# Patient Record
Sex: FEMALE | Race: WHITE
[De-identification: names, ages, dates, MRNs, and addresses within clinical notes are randomized per-mention and may not be internally consistent; named-entity substitution may affect disease eponyms.]

---

## 2018-03-10 ENCOUNTER — HOSPITAL ENCOUNTER (EMERGENCY)
Dept: HOSPITAL 68 - ERH | Age: 58
End: 2018-03-10
Payer: COMMERCIAL

## 2018-03-10 VITALS — BODY MASS INDEX: 30.2 KG/M2 | HEIGHT: 57 IN | WEIGHT: 140 LBS

## 2018-03-10 VITALS — DIASTOLIC BLOOD PRESSURE: 72 MMHG | SYSTOLIC BLOOD PRESSURE: 101 MMHG

## 2018-03-10 DIAGNOSIS — R10.30: ICD-10-CM

## 2018-03-10 DIAGNOSIS — N39.0: Primary | ICD-10-CM

## 2018-03-10 LAB
ABSOLUTE GRANULOCYTE CT: 11.6 /CUMM (ref 1.4–6.5)
BASOPHILS # BLD: 0 /CUMM (ref 0–0.2)
BASOPHILS NFR BLD: 0.3 % (ref 0–2)
EOSINOPHIL # BLD: 0.2 /CUMM (ref 0–0.7)
EOSINOPHIL NFR BLD: 1.3 % (ref 0–5)
ERYTHROCYTE [DISTWIDTH] IN BLOOD BY AUTOMATED COUNT: 16.5 % (ref 11.5–14.5)
GRANULOCYTES NFR BLD: 73.1 % (ref 42.2–75.2)
HCT VFR BLD CALC: 40.7 % (ref 37–47)
LYMPHOCYTES # BLD: 3.2 /CUMM (ref 1.2–3.4)
MCH RBC QN AUTO: 25.6 PG (ref 27–31)
MCHC RBC AUTO-ENTMCNC: 32.4 G/DL (ref 33–37)
MCV RBC AUTO: 79 FL (ref 81–99)
MONOCYTES # BLD: 0.8 /CUMM (ref 0.1–0.6)
PLATELET # BLD: 338 /CUMM (ref 130–400)
PMV BLD AUTO: 9.1 FL (ref 7.4–10.4)
RED BLOOD CELL CT: 5.16 /CUMM (ref 4.2–5.4)
WBC # BLD AUTO: 15.8 /CUMM (ref 4.8–10.8)

## 2018-03-10 NOTE — ULTRASOUND REPORT
EXAMINATION:
ULTRASOUND OF THE PELVIS
 
CLINICAL INFORMATION:
Lower abdominal pain. Postmenopausal bleeding. Rule out mass, cyst, polyp.
 
COMPARISON:
CT scan of the abdomen and pelvis dated 09/13/2008.
 
TECHNIQUE:
Transabdominal and transvaginal pelvic ultrasound.
 
A transvaginal study was performed in addition to the transabdominal study
which did not yield an adequate examination of the uterus and ovaries due to
superimposed distended gas-filled loops of bowel.
 
FINDINGS:
 
The study is technically limited. The transvaginal exam was very painful for
the patient and difficult to tolerate given her mobility. No blood was seen
on the transvaginal transducer when it was withdrawn from the vagina after
scanning.
 
 
Uterus: The uterus is not seen transabdominally. Transvaginally, the uterus
appears to be anteverted and is poorly visualized in outline, measuring 5.3 x
2.4 cm on longitudinal imaging. Uterus is not definitely visualized in
transverse dimension. The endometrial stripe and cervix are not adequately
visualized. Uterine myometrial detail is essentially nondiagnostic.
 
Ovaries: The ovaries bilaterally are not seen.
 
Other: No adnexal mass or free fluid collection seen.
 
IMPRESSION:
Essentially nondiagnostic exam of the uterus and ovaries. Please consider
alternative means of evaluating the pelvic organs, such as an MRI scan of the
pelvis with and without contrast.

## 2018-03-10 NOTE — ED GI/GU/ABDOMINAL COMPLAINT
History of Present Illness
 
General
Chief Complaint: Abdominal Pain/Flank Pain
Stated Complaint: LOWER ABD PAIN; ?VAGINAL BLEEDING
Source: patient, family
Exam Limitations: no limitations
 
Vital Signs & Intake/Output
Vital Signs & Intake/Output
 Vital Signs
 
 
Date Time Temp Pulse Resp B/P B/P Pulse O2 O2 Flow FiO2
 
     Mean Ox Delivery Rate 
 
03/10 1941 98.7 98 16 101/72  99 Room Air  
 
03/10 1826 98.7 99 18 121/74  98 Room Air  
 
 
 ED Intake and Output
 
 
  0000 03/10 1200
 
Intake Total  
 
Output Total  
 
Balance  
 
   
 
Patient 140 lb 
 
Weight  
 
Weight Reported by Patient 
 
Measurement  
 
Method  
 
 
 
Allergies
Coded Allergies:
Penicillins (HIVES 16)
Tetracyclines (HIVES 16)
aspirin (From SOMA COMPOUND WITH CODEINE) (HIVES 16)
carisoprodol (From SOMA COMPOUND WITH CODEINE) (HIVES 16)
cherry (UNKNOWN PER PT  03/10/18)
codeine (From SOMA COMPOUND WITH CODEINE) (HIVES 16)
doxycycline (HIVES 16)
lactose (LACTOSE INTOLERANT 03/10/18)
mushroom (UNKNOWN PER PT  03/10/18)
nabumetone (From RELAFEN) (ANAPHYLAXIS 16)
Uncoded Allergies:
PDS SUTURE MATERIAL (CRYSTALLIZED TISSUE 16)
 
Reconcile Medications
Albuterol Sulfate (Proventil Hfa) 6.7 GM HFA.AER.AD   2 PUF INH PRN ASTHMA- 
VIRAL INDUCED  (Reported)
Amitriptyline HCl 75 MG TABLET   1 TAB PO QPM SLEEP/NERVE PAIN  (Reported)
Ciprofloxacin HCl (Cipro) 500 MG TABLET   1 TAB PO BID uti
diphenhydrAMINE HCl (Benadryl) 25 MG CAP   2 CAP PO QPM ALLERGIES  (Reported)
Lidocaine (Lidoderm) 1 EACH ADH..PATCH   3 PAT TOP DAILY PRN PAIN  (Reported)
     may wear up to 12 hours
Metoprolol Succinate 25 MG TAB   1 TAB PO QAM HEART  (Reported)
Morphine Sulfate (Morphine Sulfate ER) 30 MG TABLET.ER   1 TAB PO TID PAIN  (
Reported)
Morphine Sulfate 15 MG TABLET   1 TAB PO BID BREAKTHROUGH PAIN  (Reported)
Omeprazole 40 MG CAPSULE.DR   1 CAP PO QPM GI  (Reported)
Polyethylene Glycol 3350 255 GM POWDER   17 GM PO PRN GI  (Reported)
Tizanidine HCl (Zanaflex) 4 MG TABLET   2 TAB PO QPM NERVE PAIN/SPASMS  (
Reported)
 
Triage Note:
PT TO ED FOR LOWER BILATERAL ABD PAIN AND PRESSURE
WITH ASSOCIATED VAGINAL BLEEDING THAT BEGAN THIS
AM, REPORTING MINIMAL BLEEDING WHILE URINATING.
Triage Nurses Notes Reviewed? yes
LMP (ages 10-50): post menopausal
Pregnant? N
Is pt currently breastfeeding? No
Onset: Gradual
Duration: hour(s):
Timing: single episode today
Quality/Severity: moderate
Location: suprapubic
HPI:
58YO female presents to emergency department complaining of suprapubic pain 
beginning last night.  Patient reports dysuria beginning this morning with 
vaginal spotting beginning this morning as well.  Patient states her last 
menstrual period was over 2 years ago.  Patient does report a history of 
interstitial cystitis and frequent UTIs in the past however she has not a UTI in
years as well.  Patient is sexually active, monogamous with her .  The 
patient denies vaginal discharge, diarrhea, constipation, nausea, vomiting, 
fevers, chills.
(Rosanna Wynn)
 
Past History
 
Travel History
Traveled to [place] past 21 day No
 
Medical History
Any Pertinent Medical History? see below for history
Neurological: NONE
EENT: NONE
Cardiovascular: hyperlipidemia
Respiratory: asthma
Gastrointestinal: NONE
Hepatic: NONE
Renal: NONE
Musculoskeletal: chronic back pain, psoariatic arthritis, CARPAL TUNNEL 
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
Psychiatric: NONE
Endocrine: NONE
Blood Disorders: NONE
Cancer(s): NONE
GYN/Reproductive: NONE
History of CDIFF: No
 
Surgical History
Surgical History: cervical, thoracic and lumbar surgery, uterine artery ablation
 
Psychosocial History
Who do you live with Family
Services at Home None
What is your primary language English
Tobacco Use: Never used
ETOH Use: denies use
Illicit Drug Use: denies illicit drug use
 
Family History
Hx Contributory? No
(Rosanna Wynn)
 
Review of Systems
 
Review of Systems
Constitutional:
Reports: no symptoms. 
EENTM:
Reports: no symptoms. 
Respiratory:
Reports: no symptoms. 
Cardiovascular:
Reports: no symptoms. 
GI:
Reports: see HPI. 
Genitourinary:
Reports: see HPI. 
Musculoskeletal:
Reports: no symptoms. 
Skin:
Reports: no symptoms. 
Neurological/Psychological:
Reports: no symptoms. 
Hematologic/Endocrine:
Reports: no symptoms. 
Immunologic/Allergic:
Reports: no symptoms. 
All Other Systems: Reviewed and Negative
(Rosanna Wynn)
 
Physical Exam
 
Physical Exam
General Appearance: well developed/nourished, no apparent distress, alert, awake
Head: atraumatic, normal appearance
Eyes:
Bilateral: normal appearance. 
Ears, Nose, Throat, Mouth: hearing grossly normal
Neck: normal inspection, supple, full range of motion
Respiratory: normal breath sounds, no respiratory distress, lungs clear
Cardiovascular: tachycardia
Gastrointestinal: normal bowel sounds, soft, no organomegaly, suprapubic 
tenderness
Back: normal inspection, normal range of motion
Extremities: normal range of motion
Neurologic/Psych: awake, alert, oriented x 3
Skin: intact, normal color, warm/dry
 
Core Measures
ACS in differential dx? No
Sepsis Present: No
Sepsis Focused Exam Completed? No
(Linh VAN,Rosanna Arshad)
 
Progress
Differential Diagnosis: hernia, kidney stone, ovarian cyst, ovarian torsion, PID
/cervicitis, UTI/pyelo, dysfunctional uterine bleeding, malignancy, fibroid, 
polyp
Plan of Care:
 Orders
 
 
Procedure Date/time Status
 
URINALYSIS 03/10 1405 Complete
 
LIPASE 03/10 1405 Complete
 
COMPREHENSIVE METABOLIC PANEL 03/10 1405 Complete
 
CBC WITHOUT DIFFERENTIAL 03/10 1405 Complete
 
 
 Laboratory Tests
 
 
 
03/10/18 1444:
Anion Gap 14, Estimated GFR > 60, BUN/Creatinine Ratio 17.5, Glucose 97, Calcium
9.9, Total Bilirubin 0.3, AST 13  L, ALT 21, Alkaline Phosphatase 107, Total 
Protein 7.3, Albumin 4.3, Globulin 3.0, Albumin/Globulin Ratio 1.4, Lipase 33, 
CBC w Diff NO MAN DIFF REQ, RBC 5.16, MCV 79.0  L, MCH 25.6  L, MCHC 32.4  L, 
RDW 16.5  H, MPV 9.1, Gran % 73.1, Lymphocytes % 20.2  L, Monocytes % 5.1, 
Eosinophils % 1.3, Basophils % 0.3, Absolute Granulocytes 11.6  H, Absolute 
Lymphocytes 3.2, Absolute Monocytes 0.8  H, Absolute Eosinophils 0.2, Absolute 
Basophils 0
 
03/10/18 1440:
Urinalysis MOD  H, Urine Color BLDY  H, Urine Clarity CLDY  H, Urine pH 6.5, Ur 
Specific Gravity 1.025, Urine Protein >=300  H, Urine Ketones TRACE  H, Urine 
Nitrite POS  H, Urine Bilirubin NEG@ICTO, Urine Urobilinogen 1.0, Ur Leukocyte 
Esterase MOD  H, Ur Microscopic SEDIMENT EXAMINED, Urine RBC PACKD  H, Urine WBC
> 75  H, Ur Epithelial Cells FEW, Urine Hemoglobin LARGE  H, Urine Glucose NEG
 
Ultrasound is not a definitive study given patient's body habitus and bowel 
loops obstructing view however no acute abnormality detected on ultrasound.  
Urine shows evidence for UTI, patient also has tachycardia and leukocytosis.  
Given these findings will obtain an abdominal CT scan for further assessment.
 
CT scan is within normal limits.  Patient's vital signs have improved following 
IV fluids.  Patient to begin Cipro antibiotics regarding her UTI and follow-up 
with urologist.  Patient was given strict return precautions, she feels 
comfortable going home at this time.  Vital signs have improved, she is nontoxic
appearing, no acute distress.  The patient agrees with the plan of care.  The 
patient was discussed with Dr. Steele.
Diagnostic Imaging:
Viewed by Me: CT Scan, Ultrasound.  Discussed w/RAD: CT Scan, Ultrasound. 
Radiology Impression: PATIENT: SEBASTIAN VALDEZ  MEDICAL RECORD NO: [id number] 
PRESENT AGE: 57  PATIENT ACCOUNT NO: [id number] : 62  LOCATION: [facility] 
ORDERING PHYSICIAN: Rosanna VAN     SERVICE DATE: 03/10/ EXAM 
TYPE: US - US-TRANSVAGINAL EXAMINATION: ULTRASOUND OF THE PELVIS CLINICAL 
INFORMATION: Lower abdominal pain. Postmenopausal bleeding. Rule out mass, cyst,
polyp. COMPARISON: CT scan of the abdomen and pelvis dated 2008. TECHNIQUE
: Transabdominal and transvaginal pelvic ultrasound. A transvaginal study was 
performed in addition to the transabdominal study which did not yield an 
adequate examination of the uterus and ovaries due to superimposed distended gas
-filled loops of bowel. FINDINGS: The study is technically limited. The 
transvaginal exam was very painful for the patient and difficult to tolerate 
given her mobility. No blood was seen on the transvaginal transducer when it was
withdrawn from the vagina after scanning. Uterus: The uterus is not seen 
transabdominally. Transvaginally, the uterus appears to be anteverted and is 
poorly visualized in outline, measuring 5.3 x 2.4 cm on longitudinal imaging. 
Uterus is not definitely visualized in transverse dimension. The endometrial 
stripe and cervix are not adequately visualized. Uterine myometrial detail is 
essentially nondiagnostic. Ovaries: The ovaries bilaterally are not seen. Other:
No adnexal mass or free fluid collection seen. IMPRESSION: Essentially 
nondiagnostic exam of the uterus and ovaries. Please consider alternative means 
of evaluating the pelvic organs, such as an MRI scan of the pelvis with and 
without contrast. DICTATED BY: Giana Nieves MD  DATE/TIME DICTATED:03/10/18 /
1630 :[facility]  DATE/TIME TRANSCRIBED:03/10/18 / 1630 
CONFIDENTIAL, DO NOT COPY WITHOUT APPROPRIATE AUTHORIZATION.  <Electronically 
signed in Other Vendor System>                                                  
                                     SIGNED BY: Giana Nieves MD 03/10/18 1644
, PATIENT: SEBASTIAN VALDEZ  MEDICAL RECORD NO: [id number] PRESENT AGE: 57  PATIENT 
ACCOUNT NO: [id number] : 62  LOCATION: [facility] ORDERING PHYSICIAN: Rosanna VAN     SERVICE DATE: 03/10/ EXAM TYPE: CAT - CT ABD & PELVIS W
IV CONTRAST EXAMINATION: CT ABDOMEN AND PELVIS WITH CONTRAST CLINICAL 
INFORMATION: Dysuria. Abdominal pain. Rule out colitis, diverticulitis, 
pyelonephritis. COMPARISON: The scan of the abdomen and pelvis dated 2008.
TECHNIQUE: Multidetector volumetric imaging was performed of the abdomen and 
pelvis following IV administration of 95 mL of Omnipaque 320 intravenous 
contrast. Sagittal and coronal reformatted images were obtained on the 
technologist's workstation. DLP: 287.19 mGy-cm FINDINGS: LUNG BASES: The 
visualized lung bases are unremarkable. LIVER, GALLBLADDER, AND BILIARY TREE: 
The liver is normal in size, shape, and attenuation. No focal hepatic lesion or 
biliary ductal dilatation is present. The gallbladder is unremarkable with no 
evidence of radiopaque gallstones, gallbladder wall thickening, or obvious 
pericholecystic inflammatory changes. PANCREAS: Unremarkable. SPLEEN: 
Unremarkable. ADRENAL GLANDS: Unremarkable. KIDNEYS AND URETERS: The kidneys are
normal in size, shape, and attenuation. No hydronephrosis, hydroureter, or 
calculi seen. No perinephric stranding. BLADDER: Unremarkable. GASTROINTESTINAL 
TRACT: Some fullness is seen at the GE junction, likely due to tiny hiatal 
hernia. The small and large bowel are unremarkable. The appendix is 
unremarkable. ABDOMINAL WALL: There is likely a tiny fat-containing umbilical 
hernia. LYMPH NODES: Normal. VASCULAR: Unremarkable. PELVIC VISCERA: Uterus and 
adnexa are normal. OSSEOUS STRUCTURES: Moderate degenerative disc disease at L4-
L5 and L5-S1. Mild vertebral spondylosis in lower thoracic spine. Mild facet 
arthropathy in lower lumbar spine. IMPRESSION: 1. No significant abnormality. No
evidence of colitis, diverticulitis or pyelonephritis. 2. Probable tiny hiatal 
hernia and fat-containing umbilical hernia. DICTATED BY: Giana Nieves MD  
DATE/TIME DICTATED:03/10/18 / 1848 :[facility]  DATE/TIME 
TRANSCRIBED:03/10/18 / 1848 CONFIDENTIAL, DO NOT COPY WITHOUT APPROPRIATE 
AUTHORIZATION.  <Electronically signed in Other Vendor System>                  
                                                                     SIGNED BY: 
Giana Nieves MD 03/10/18 1859
Initial ED EKG: none
(Linh VAN,Rosanna Arshad)
 
Departure
 
Departure
Disposition: HOME OR SELF CARE
Condition: Stable
Clinical Impression
Primary Impression: UTI (urinary tract infection)
Qualifiers:  Urinary tract infection type: acute cystitis Hematuria presence: 
with hematuria Qualified Code: N30.01 - Acute cystitis with hematuria
Secondary Impressions: Suprapubic pain
Referrals:
Oscar DICKEY,Marylou Espitia MD,Geovanna (PCP/Family)
 
Ruby DICKEY,Sami ORTEZ
 
Additional Instructions:
Begin antibiotics tonight.  Take full course of antibiotics.  Follow-up with 
urologist.  It is also recommended that you follow up with OB/GYN.  Return if 
any worsening symptoms or concerns.
 
Please note that there might be incidental findings in your evaluation that are 
unrelated to the current emergency department visit.  Please notify your primary
care doctor about this emergency department visit in order to obtain and review 
all of the testing performed so that these incidental findings can be monitored 
as needed.
 
If you had an x-ray performed, please understand that some fractures may not be 
seen on the initial set of x-rays.  If your symptoms persist you might need a 
repeat set of x-rays to check for such a fracture.
 
If you had a laceration evaluated, please understand that foreign bodies such as
glass or wood may not be visible to the naked eye or on plain x-rays.  If the 
wound becomes red, swollen, increasingly more painful or if there is any 
drainage from the wound, please have it reevaluated by a physician for the 
possibility of a retained foreign body.
 
If you're unable to follow up as outlined in the discharge instructions please 
return to the emergency department.
 
Thank you for choosing the [facility] Emergency Department for your care.
It was a pleasure to serve you today.
Departure Forms:
Customer Survey
General Discharge Information
Prescriptions:
Current Visit Scripts
Ciprofloxacin HCl (Cipro) 1 TAB PO BID  
     #14 TAB 
 
 
(Linh VAN,Rosanna Arshad)
 
PA/NP Co-Sign Statement
Statement:
ED Attending supervision documentation-
 
[] I saw and evaluated the patient. I have also reviewed all the pertinent lab 
results and diagnostic results. I agree with the findings and the plan of care 
as documented in the PA's/NP's documentation. 
 
[X] I have reviewed the ED Record and agree with the PA's/NP's documentation.
 
[] Additions or exceptions (if any) to the PAs/NP's note and plan are 
summarized below:
[]
 
(Cedric DICKEY,Duncan SORIANO)

## 2018-03-10 NOTE — CT SCAN REPORT
EXAMINATION:
CT ABDOMEN AND PELVIS WITH CONTRAST
 
CLINICAL INFORMATION:
Dysuria. Abdominal pain. Rule out colitis, diverticulitis, pyelonephritis.
 
COMPARISON:
The scan of the abdomen and pelvis dated 09/13/2008.
 
TECHNIQUE:
Multidetector volumetric imaging was performed of the abdomen and pelvis
following IV administration of 95 mL of Omnipaque 320 intravenous contrast.
Sagittal and coronal reformatted images were obtained on the technologist's
workstation.
 
DLP:
287.19 mGy-cm
 
FINDINGS:
 
LUNG BASES: The visualized lung bases are unremarkable.
 
LIVER, GALLBLADDER, AND BILIARY TREE: The liver is normal in size, shape, and
attenuation. No focal hepatic lesion or biliary ductal dilatation is present.
The gallbladder is unremarkable with no evidence of radiopaque gallstones,
gallbladder wall thickening, or obvious pericholecystic inflammatory changes.
 
 
PANCREAS: Unremarkable.
 
SPLEEN: Unremarkable.
 
ADRENAL GLANDS: Unremarkable.
 
KIDNEYS AND URETERS: The kidneys are normal in size, shape, and attenuation.
No hydronephrosis, hydroureter, or calculi seen. No perinephric stranding.
 
BLADDER: Unremarkable.
 
GASTROINTESTINAL TRACT: Some fullness is seen at the GE junction, likely due
to tiny hiatal hernia. The small and large bowel are unremarkable. The
appendix is unremarkable.
 
ABDOMINAL WALL: There is likely a tiny fat-containing umbilical hernia.
 
LYMPH NODES: Normal.
 
VASCULAR: Unremarkable.
 
PELVIC VISCERA: Uterus and adnexa are normal.
 
OSSEOUS STRUCTURES: Moderate degenerative disc disease at L4-L5 and L5-S1.
Mild vertebral spondylosis in lower thoracic spine. Mild facet arthropathy in
lower lumbar spine.
 
IMPRESSION:
 
1. No significant abnormality. No evidence of colitis, diverticulitis or
pyelonephritis.
2. Probable tiny hiatal hernia and fat-containing umbilical hernia.